# Patient Record
Sex: FEMALE | Race: WHITE | Employment: FULL TIME
[De-identification: names, ages, dates, MRNs, and addresses within clinical notes are randomized per-mention and may not be internally consistent; named-entity substitution may affect disease eponyms.]

---

## 2020-05-18 ENCOUNTER — NURSE TRIAGE (OUTPATIENT)
Dept: OTHER | Facility: CLINIC | Age: 30
End: 2020-05-18

## 2020-06-15 ENCOUNTER — OFFICE VISIT (OUTPATIENT)
Dept: PRIMARY CARE CLINIC | Age: 30
End: 2020-06-15

## 2020-06-15 PROCEDURE — 99211 OFF/OP EST MAY X REQ PHY/QHP: CPT | Performed by: NURSE PRACTITIONER

## 2020-06-15 NOTE — PROGRESS NOTES
FLU/COVID-19 CLINIC EVALUATION  HPI:Patient is in office today with concern for a known close exposure to COVID-19 Virus. The patient is requesting screening evaluation and COVID-19 testing. Symptom duration, days:  [] 1   [] 2    []3   [] 4    []5    []6   [] 7    []8    []9   [] 10    []11 []  12   [] 13    []14 or greater    There were no vitals filed for this visit. ROS:ROS negative unless otherwise noted below      []Fevers   []Headaches  []Sore throat  []Chest pain/heaviness  []Muscle aches  []Feeling short of breath  []Nausea   []Nasal Congestion  []Vomiting  []Chest Congestion    []Diarrhea  []Cough  []Loss of taste/smell  []Chills    OTHER SYMPTOMS:      RISK FACTORS:  []Pregnancy   []Diabetes  []Heart disease  []Asthma  []COPD/Other chronic lung diseases  []Active Cancer/Chemotherapy   []Taking oral steroids  []Close contact with a lab confirmed COVID-19 patient within 14 days of symptom onset  []Health Care Worker Exposure no symptoms  []Health Care Worker Exposure symptomatic    Assessment  Physical Exam    Constitutional:       Appearance: Normal appearance. HENT:      Head: Normocephalic and atraumatic. Mouth/Throat:      Mouth: Mucous membranes are moist.   Neck:      Musculoskeletal: Normal range of motion. Cardiovascular:     Skin pink and warm     Pulmonary:      Effort: Pulmonary effort is normal.   Musculoskeletal: Normal range of motion. Skin:     General: Skin is warm and dry. Neurological:      General: No focal deficit present. Mental Status: Alert. Mental status is at baseline. Test ordered:    [x]COVID    _________  []Strep    ___________  []Flu       _________      Diagnosis and Plan:      Diagnosis Orders   1. Suspected COVID-19 virus infection  Covid-19 Ambulatory     COVID-19 swab complete at clinic and sent to lab for testing. Quarantine order in place, patient verbalized understanding. Preventing the spread of Coronavirus, Possible COVID-19 exposure with

## 2020-06-17 LAB
SARS-COV-2: NOT DETECTED
SOURCE: NORMAL

## 2020-06-17 NOTE — RESULT ENCOUNTER NOTE
Please contact patient with their testing results: Your test for COVID-19, also known as novel coronavirus, came back negative. No virus was detected from the sample collected. Until your symptoms are fully resolved, you may still be contagious. We recommend that you remain isolated for 7 days minimum or 72 hours after your symptoms have completely resolved, whichever is longer. Continually monitor symptoms. Contact a medical provider if symptoms are worsening. If you have any additional questions, contact your PCP.     For additional information, please visit the Centers for Disease Control and Prevention   [email or web address]

## 2021-01-18 ENCOUNTER — OFFICE VISIT (OUTPATIENT)
Dept: GYNECOLOGY | Age: 31
End: 2021-01-18
Payer: COMMERCIAL

## 2021-01-18 VITALS
HEART RATE: 71 BPM | TEMPERATURE: 97.3 F | HEIGHT: 67 IN | DIASTOLIC BLOOD PRESSURE: 58 MMHG | BODY MASS INDEX: 21.66 KG/M2 | SYSTOLIC BLOOD PRESSURE: 105 MMHG | OXYGEN SATURATION: 98 % | RESPIRATION RATE: 16 BRPM | WEIGHT: 138 LBS

## 2021-01-18 DIAGNOSIS — Z01.419 WELL WOMAN EXAM WITH ROUTINE GYNECOLOGICAL EXAM: Primary | ICD-10-CM

## 2021-01-18 PROCEDURE — 99385 PREV VISIT NEW AGE 18-39: CPT | Performed by: OBSTETRICS & GYNECOLOGY

## 2021-01-18 PROCEDURE — 90471 IMMUNIZATION ADMIN: CPT | Performed by: OBSTETRICS & GYNECOLOGY

## 2021-01-18 PROCEDURE — 90651 9VHPV VACCINE 2/3 DOSE IM: CPT | Performed by: OBSTETRICS & GYNECOLOGY

## 2021-01-18 ASSESSMENT — ENCOUNTER SYMPTOMS
RECTAL PAIN: 0
ABDOMINAL DISTENTION: 0
TROUBLE SWALLOWING: 0
DIARRHEA: 0
SHORTNESS OF BREATH: 0
PHOTOPHOBIA: 0
BLOOD IN STOOL: 0
COUGH: 0
ABDOMINAL PAIN: 0
COLOR CHANGE: 0
APNEA: 0
NAUSEA: 0
SORE THROAT: 0
WHEEZING: 0
CONSTIPATION: 0
VOMITING: 0
CHEST TIGHTNESS: 0
ANAL BLEEDING: 0
BACK PAIN: 0

## 2021-01-18 NOTE — PROGRESS NOTES
Annual GYN Visit    Iram Da Silva  Date ofBirth:  1990    Date of Service:  2021    Chief Complaint:   Adalberto Gallardo is a 36 y.o. virginal [de-identified]  female who presents for routine annual gynecologic visit. HPI:  Patient is premenopausal- Patient's last menstrual period was 2021 (exact date). .  She is here for routine annual exam. Menses are regular with normal flow lasting 3-7 days, normal flow. She reports history of anorexia nervosa x 3 years - age [age] years and worked with a physician and dietician for this and per patient doing well. Had amenorrhea for 3 years at that time. BMI = 22 today.  Denies being sexually active     Health Maintenance   Topic Date Due    Hepatitis C screen  1990    Varicella vaccine (1 of 2 - 2-dose childhood series) 1993    HIV screen  2007    Cervical cancer screen  2013    DTaP/Tdap/Td vaccine (8 - Td) 2023    Hepatitis B vaccine  Completed    Hib vaccine  Completed    Meningococcal (ACWY) vaccine  Completed    Flu vaccine  Completed    Hepatitis A vaccine  Aged Out    Pneumococcal 0-64 years Vaccine  Aged Out       Past Medical History:   Diagnosis Date    Anorexia     [id number]     Past Surgical History:   Procedure Laterality Date    WISDOM TOOTH EXTRACTION       OB History    Para Term  AB Living   0 0 0 0 0 0   SAB TAB Ectopic Molar Multiple Live Births   0 0 0 0 0 0     Social History     Socioeconomic History    Marital status: Single     Spouse name: Not on file    Number of children: Not on file    Years of education: Not on file    Highest education level: Not on file   Occupational History    Not on file   Social Needs    Financial resource strain: Not on file    Food insecurity     Worry: Not on file     Inability: Not on file    Transportation needs     Medical: Not on file     Non-medical: Not on file   Tobacco Use    Smoking status: Never Smoker    Smokeless tobacco: Never Used Substance and Sexual Activity    Alcohol use: Yes     Comment: occ    Drug use: Never    Sexual activity: Never   Lifestyle    Physical activity     Days per week: Not on file     Minutes per session: Not on file    Stress: Not on file   Relationships    Social connections     Talks on phone: Not on file     Gets together: Not on file     Attends Moravian service: Not on file     Active member of club or organization: Not on file     Attends meetings of clubs or organizations: Not on file     Relationship status: Not on file    Intimate partner violence     Fear of current or ex partner: Not on file     Emotionally abused: Not on file     Physically abused: Not on file     Forced sexual activity: Not on file   Other Topics Concern    Not on file   Social History Narrative    Not on file     Allergies   Allergen Reactions    Cefaclor Other (See Comments)     In infancy    Amoxicillin     Nickel Rash     No outpatient medications have been marked as taking for the 1/18/21 encounter (Office Visit) with Yohannes Arrington MD.     Family History   Problem Relation Age of Onset    Cancer Maternal Aunt         Brain    Cancer Maternal Grandmother     Stroke Paternal Grandfather     Heart Failure Paternal Grandfather          Review of Systems:  Review of Systems   Constitutional: Negative for appetite change, chills, fatigue, fever and unexpected weight change. HENT: Negative for ear pain, hearing loss, mouth sores, sore throat and trouble swallowing. Eyes: Negative for photophobia and visual disturbance. Respiratory: Negative for apnea, cough, chest tightness, shortness of breath and wheezing. Cardiovascular: Negative for chest pain, palpitations and leg swelling. Gastrointestinal: Negative for abdominal distention, abdominal pain, anal bleeding, blood in stool, constipation, diarrhea, nausea, rectal pain and vomiting.    Endocrine: Negative for cold intolerance, heat intolerance, polydipsia, polyphagia and polyuria. Genitourinary: Negative for difficulty urinating, dyspareunia, dysuria, enuresis, frequency, genital sores, hematuria, menstrual problem, pelvic pain, urgency, vaginal bleeding, vaginal discharge and vaginal pain. Musculoskeletal: Negative for arthralgias, back pain, joint swelling and myalgias. Skin: Negative for color change and rash. Neurological: Negative for dizziness, seizures, syncope, light-headedness and headaches. Psychiatric/Behavioral: Negative for agitation, behavioral problems, confusion, decreased concentration, dysphoric mood, hallucinations, self-injury, sleep disturbance and suicidal ideas. The patient is not nervous/anxious and is not hyperactive. Physical Exam:  BP (!) 105/58 (Site: Right Upper Arm, Position: Sitting, Cuff Size: Medium Adult)   Pulse 71   Temp 97.3 °F (36.3 °C) (Tympanic)   Resp 16   Ht 5' 6.5\" (1.689 m)   Wt 138 lb (62.6 kg)   LMP 01/04/2021 (Exact Date)   SpO2 98%   Breastfeeding No   BMI 21.94 kg/m²   BP Readings from Last 3 Encounters:   01/18/21 (!) 105/58     Body mass index is 21.94 kg/m². Physical Exam  Constitutional:       General: She is not in acute distress. Appearance: She is well-developed. HENT:      Head: Normocephalic and atraumatic. Mouth/Throat:      Pharynx: No oropharyngeal exudate. Eyes:      General: No scleral icterus. Right eye: No discharge. Left eye: No discharge. Conjunctiva/sclera: Conjunctivae normal.   Neck:      Thyroid: No thyromegaly. Cardiovascular:      Rate and Rhythm: Normal rate and regular rhythm. Heart sounds: Normal heart sounds. Pulmonary:      Effort: Pulmonary effort is normal.      Breath sounds: Normal breath sounds. Abdominal:      General: Bowel sounds are normal. There is no distension. Palpations: Abdomen is soft. There is no mass. Tenderness: There is no abdominal tenderness. There is no guarding or rebound. Genitourinary:     Labia:         Right: No rash, tenderness, lesion or injury. Left: No rash, tenderness, lesion or injury. Vagina: Normal. No signs of injury and foreign body. No vaginal discharge, erythema or tenderness. Cervix: No cervical motion tenderness, discharge or friability. Uterus: Not deviated, not enlarged, not fixed and not tender. Adnexa:         Right: No mass, tenderness or fullness. Left: No mass, tenderness or fullness. Rectum: No mass or external hemorrhoid. Skin:     General: Skin is warm. Coloration: Skin is not pale. Findings: No erythema or rash. Neurological:      Mental Status: She is alert. Psychiatric:         Behavior: Behavior normal. Behavior is cooperative. Thought Content: Thought content normal.         Judgment: Judgment normal.           Assessment/Plan:  1. Well woman exam with routine gynecological exam  - PAP SMEAR  Self breast exam discussed and encouraged  Diet and exercise discussed  Discussed daily multi-vitamin and adequate calcium and vitamin D in diet  Safe sex and usage of condoms discussed   BCM - abstinence   gardisal discussed - first dose administered in office today     Return in about 2 months (around 3/18/2021).  gardisal #2

## 2021-02-08 ENCOUNTER — OFFICE VISIT (OUTPATIENT)
Dept: INTERNAL MEDICINE CLINIC | Age: 31
End: 2021-02-08
Payer: COMMERCIAL

## 2021-02-08 VITALS
BODY MASS INDEX: 21.82 KG/M2 | TEMPERATURE: 97.1 F | WEIGHT: 139 LBS | OXYGEN SATURATION: 97 % | HEART RATE: 89 BPM | DIASTOLIC BLOOD PRESSURE: 78 MMHG | SYSTOLIC BLOOD PRESSURE: 110 MMHG | HEIGHT: 67 IN

## 2021-02-08 DIAGNOSIS — R63.0 ANOREXIA: ICD-10-CM

## 2021-02-08 DIAGNOSIS — R20.2 NUMBNESS AND TINGLING: Primary | ICD-10-CM

## 2021-02-08 DIAGNOSIS — Z13.9 SCREENING FOR CONDITION: ICD-10-CM

## 2021-02-08 DIAGNOSIS — R23.1 PALLOR: ICD-10-CM

## 2021-02-08 DIAGNOSIS — R10.2 PELVIC PAIN: ICD-10-CM

## 2021-02-08 DIAGNOSIS — R20.0 NUMBNESS AND TINGLING: Primary | ICD-10-CM

## 2021-02-08 DIAGNOSIS — B35.1 ONYCHOMYCOSIS: ICD-10-CM

## 2021-02-08 LAB
BILIRUBIN, POC: NORMAL
BLOOD URINE, POC: NORMAL
CLARITY, POC: NORMAL
COLOR, POC: YELLOW
GLUCOSE URINE, POC: NORMAL
KETONES, POC: NORMAL
LEUKOCYTE EST, POC: NORMAL
NITRITE, POC: NORMAL
PH, POC: 7
PROTEIN, POC: NORMAL
SPECIFIC GRAVITY, POC: 1.02
UROBILINOGEN, POC: 0.2

## 2021-02-08 PROCEDURE — 99204 OFFICE O/P NEW MOD 45 MIN: CPT | Performed by: INTERNAL MEDICINE

## 2021-02-08 PROCEDURE — 81002 URINALYSIS NONAUTO W/O SCOPE: CPT | Performed by: INTERNAL MEDICINE

## 2021-02-08 RX ORDER — CLOTRIMAZOLE 1 G/ML
SOLUTION TOPICAL
Qty: 1 BOTTLE | Refills: 0 | Status: SHIPPED | OUTPATIENT
Start: 2021-02-08

## 2021-02-08 SDOH — ECONOMIC STABILITY: FOOD INSECURITY: WITHIN THE PAST 12 MONTHS, YOU WORRIED THAT YOUR FOOD WOULD RUN OUT BEFORE YOU GOT MONEY TO BUY MORE.: NEVER TRUE

## 2021-02-08 SDOH — ECONOMIC STABILITY: INCOME INSECURITY: HOW HARD IS IT FOR YOU TO PAY FOR THE VERY BASICS LIKE FOOD, HOUSING, MEDICAL CARE, AND HEATING?: NOT HARD AT ALL

## 2021-02-08 ASSESSMENT — PATIENT HEALTH QUESTIONNAIRE - PHQ9
1. LITTLE INTEREST OR PLEASURE IN DOING THINGS: 0
SUM OF ALL RESPONSES TO PHQ QUESTIONS 1-9: 0

## 2021-02-19 DIAGNOSIS — R20.2 NUMBNESS AND TINGLING: ICD-10-CM

## 2021-02-19 DIAGNOSIS — R23.1 PALLOR: ICD-10-CM

## 2021-02-19 DIAGNOSIS — R20.0 NUMBNESS AND TINGLING: ICD-10-CM

## 2021-02-19 DIAGNOSIS — Z13.9 SCREENING FOR CONDITION: ICD-10-CM

## 2021-02-19 LAB
A/G RATIO: 1.8 (ref 1.1–2.2)
ALBUMIN SERPL-MCNC: 4.5 G/DL (ref 3.4–5)
ALP BLD-CCNC: 53 U/L (ref 40–129)
ALT SERPL-CCNC: 13 U/L (ref 10–40)
ANION GAP SERPL CALCULATED.3IONS-SCNC: 9 MMOL/L (ref 3–16)
AST SERPL-CCNC: 16 U/L (ref 15–37)
BASOPHILS ABSOLUTE: 0.1 K/UL (ref 0–0.2)
BASOPHILS RELATIVE PERCENT: 1.2 %
BILIRUB SERPL-MCNC: 0.4 MG/DL (ref 0–1)
BUN BLDV-MCNC: 11 MG/DL (ref 7–20)
CALCIUM SERPL-MCNC: 9.6 MG/DL (ref 8.3–10.6)
CHLORIDE BLD-SCNC: 96 MMOL/L (ref 99–110)
CHOLESTEROL, TOTAL: 155 MG/DL (ref 0–199)
CO2: 28 MMOL/L (ref 21–32)
CREAT SERPL-MCNC: 0.8 MG/DL (ref 0.6–1.1)
EOSINOPHILS ABSOLUTE: 0 K/UL (ref 0–0.6)
EOSINOPHILS RELATIVE PERCENT: 0.7 %
GFR AFRICAN AMERICAN: >60
GFR NON-AFRICAN AMERICAN: >60
GLOBULIN: 2.5 G/DL
GLUCOSE BLD-MCNC: 88 MG/DL (ref 70–99)
HCT VFR BLD CALC: 35.4 % (ref 36–48)
HEMOGLOBIN: 11.8 G/DL (ref 12–16)
LYMPHOCYTES ABSOLUTE: 1.4 K/UL (ref 1–5.1)
LYMPHOCYTES RELATIVE PERCENT: 19.3 %
MAGNESIUM: 2.1 MG/DL (ref 1.8–2.4)
MCH RBC QN AUTO: 27.1 PG (ref 26–34)
MCHC RBC AUTO-ENTMCNC: 33.2 G/DL (ref 31–36)
MCV RBC AUTO: 81.6 FL (ref 80–100)
MONOCYTES ABSOLUTE: 0.5 K/UL (ref 0–1.3)
MONOCYTES RELATIVE PERCENT: 6.3 %
NEUTROPHILS ABSOLUTE: 5.4 K/UL (ref 1.7–7.7)
NEUTROPHILS RELATIVE PERCENT: 72.5 %
PDW BLD-RTO: 14.6 % (ref 12.4–15.4)
PLATELET # BLD: 345 K/UL (ref 135–450)
PMV BLD AUTO: 7.8 FL (ref 5–10.5)
POTASSIUM SERPL-SCNC: 4.4 MMOL/L (ref 3.5–5.1)
RBC # BLD: 4.34 M/UL (ref 4–5.2)
SODIUM BLD-SCNC: 133 MMOL/L (ref 136–145)
TOTAL PROTEIN: 7 G/DL (ref 6.4–8.2)
WBC # BLD: 7.4 K/UL (ref 4–11)

## 2021-02-20 LAB
FOLATE: 16.09 NG/ML (ref 4.78–24.2)
HEPATITIS C ANTIBODY INTERPRETATION: NORMAL
HIV AG/AB: NORMAL
HIV ANTIGEN: NORMAL
HIV-1 ANTIBODY: NORMAL
HIV-2 AB: NORMAL
TSH REFLEX: 1.45 UIU/ML (ref 0.27–4.2)
VITAMIN B-12: 694 PG/ML (ref 211–911)
VITAMIN D 25-HYDROXY: 38.6 NG/ML

## 2021-03-01 ENCOUNTER — NURSE ONLY (OUTPATIENT)
Dept: GYNECOLOGY | Age: 31
End: 2021-03-01
Payer: COMMERCIAL

## 2021-03-01 VITALS — TEMPERATURE: 96.7 F

## 2021-03-01 DIAGNOSIS — Z71.85 VACCINE COUNSELING: Primary | ICD-10-CM

## 2021-03-01 PROCEDURE — 90651 9VHPV VACCINE 2/3 DOSE IM: CPT | Performed by: OBSTETRICS & GYNECOLOGY

## 2021-03-01 PROCEDURE — 90471 IMMUNIZATION ADMIN: CPT | Performed by: OBSTETRICS & GYNECOLOGY

## 2021-10-05 ENCOUNTER — TELEPHONE (OUTPATIENT)
Dept: GYNECOLOGY | Age: 31
End: 2021-10-05

## 2021-10-05 ENCOUNTER — TELEPHONE (OUTPATIENT)
Dept: INTERNAL MEDICINE CLINIC | Age: 31
End: 2021-10-05

## 2021-10-05 DIAGNOSIS — R92.2 BREAST DENSITY: Primary | ICD-10-CM

## 2021-10-05 NOTE — TELEPHONE ENCOUNTER
Patient has notice over the past 2 months that the breast tissue has become firm and causing discomfort in her right breast. Patient is requesting what the next steps are.

## 2021-10-05 NOTE — TELEPHONE ENCOUNTER
Patient tried scheduling with NP in [address] [place] [address], they weren't able to schedule her until October 10-22. Patient feels that it's too long of a wait. Patient is very concerned that this could be cancer. She has found another breast center ([place] [address]) they need a referral faxed to them in order to open their schedule. Fax number is [phone]. Patient says that she is determined to find an appointment before the end of next week.       Patient can be reached at [phone] Polyps removed with cold snare

## 2021-10-05 NOTE — TELEPHONE ENCOUNTER
Patient is a new patient scheduled for 1/24/22, please advise how quickly do I need to move her up? ?

## 2021-10-05 NOTE — TELEPHONE ENCOUNTER
Go ahead and refer her to Breast Center-I think they can get her in faster. Have her keep January appointment with me.  [phone]

## 2021-10-05 NOTE — TELEPHONE ENCOUNTER
Refer her to Kyle Frankel the nurse practioner. Give her number again to call. Or Dr. Norma Casiano.

## 2021-10-05 NOTE — TELEPHONE ENCOUNTER
Patient called the number but was told to call central scheduling, after calling central scheduling she was told that she needed an order for a Diagnosis mammogram. Please advise.  Patient can be reached at [phone]

## 2021-10-05 NOTE — TELEPHONE ENCOUNTER
The following message was left on the Non-Emergency Line:    Patient is requesting her new patient appointment be moved up sooner stating that she does breast exams monthly, and has noticed a change in her R breast, stating the tissues is rapidly changing, is firmer and getting larger.     Please contact patient @ phone # provided regarding a sooner appointment

## 2021-10-06 NOTE — TELEPHONE ENCOUNTER
I spoke with pt she is going to see the GYN this week. She will call if she need to be seen she stated she is ok with seeing the gyn Dr. Robert Alves.

## 2021-10-07 ENCOUNTER — OFFICE VISIT (OUTPATIENT)
Dept: GYNECOLOGY | Age: 31
End: 2021-10-07
Payer: COMMERCIAL

## 2021-10-07 VITALS
BODY MASS INDEX: 23.11 KG/M2 | OXYGEN SATURATION: 98 % | RESPIRATION RATE: 17 BRPM | DIASTOLIC BLOOD PRESSURE: 72 MMHG | HEIGHT: 66 IN | WEIGHT: 143.8 LBS | HEART RATE: 82 BPM | SYSTOLIC BLOOD PRESSURE: 112 MMHG

## 2021-10-07 DIAGNOSIS — N64.4 BREAST TENDERNESS: ICD-10-CM

## 2021-10-07 DIAGNOSIS — R92.2 BREAST DENSITY: Primary | ICD-10-CM

## 2021-10-07 PROCEDURE — 99203 OFFICE O/P NEW LOW 30 MIN: CPT | Performed by: OBSTETRICS & GYNECOLOGY

## 2021-10-07 RX ORDER — AZITHROMYCIN 250 MG/1
250 TABLET, FILM COATED ORAL 2 TIMES DAILY
Qty: 10 TABLET | Refills: 0 | Status: SHIPPED | OUTPATIENT
Start: 2021-10-07 | End: 2021-10-12

## 2021-10-07 ASSESSMENT — ENCOUNTER SYMPTOMS
RESPIRATORY NEGATIVE: 1
GASTROINTESTINAL NEGATIVE: 1
EYES NEGATIVE: 1

## 2021-10-07 NOTE — PROGRESS NOTES
[address] [facility] [address]  Dept: [phone]  Dept Fax: [phone]  Loc: [phone]     10/7/2021    Olayinka Da Silva (:  1990) is a 36 y.o. female, here for evaluation of the following medical concerns:    Patient with breast mass/tenderness. Has gotten worse over the last week. Patient concerned. Is in middle of menstrual cycle. Gynecologic Exam  This is a new problem. The current episode started 1 to 4 weeks ago. The problem occurs daily. The problem has been gradually worsening. Pertinent negatives include no rash. Nothing aggravates the symptoms. She has tried nothing for the symptoms. The treatment provided no relief. Review of Systems   Constitutional: Negative. HENT: Negative. Eyes: Negative. Respiratory: Negative. Cardiovascular: Negative. Gastrointestinal: Negative. Genitourinary: Negative. Musculoskeletal: Negative. Skin: Negative. Negative for rash. Neurological: Negative. Psychiatric/Behavioral: Negative.       Date of Birth 1990  Past Medical History:   Diagnosis Date    Anorexia     [id number]    Anorexia     Bacterial vaginosis     Dysmenorrhea     Irregular uterine bleeding     Menorrhagia     Scoliosis      Past Surgical History:   Procedure Laterality Date    NOSE SURGERY      FFG SURGERY - CHILDHOOD    WISDOM TOOTH EXTRACTION  2007    WISDOM TOOTH EXTRACTION       OB History    Para Term  AB Living   0 0 0 0 0 0   SAB TAB Ectopic Molar Multiple Live Births   0 0 0 0 0 0     Social History     Socioeconomic History    Marital status: Single     Spouse name: Not on file    Number of children: Not on file    Years of education: Not on file    Highest education level: Not on file   Occupational History    Not on file   Tobacco Use    Smoking status: Never Smoker    Smokeless tobacco: Never Used   Vaping Use    Vaping Use: Never used   Substance and Sexual Activity    Alcohol use: Yes     Comment: OCCASIONAL    Drug use: Never    Sexual activity: Not Currently   Other Topics Concern    Not on file   Social History Narrative    Not on file     Social Determinants of Health     Financial Resource Strain: Low Risk     Difficulty of Paying Living Expenses: Not hard at all   Food Insecurity: No Food Insecurity    Worried About Running Out of Food in the Last Year: Never true    Nichole of Food in the Last Year: Never true   Transportation Needs: No Transportation Needs    Lack of Transportation (Medical): No    Lack of Transportation (Non-Medical): No   Physical Activity:     Days of Exercise per Week:     Minutes of Exercise per Session:    Stress:     Feeling of Stress :    Social Connections:     Frequency of Communication with Friends and Family:     Frequency of Social Gatherings with Friends and Family:     Attends Pentecostal Services:     Active Member of Clubs or Organizations:     Attends Club or Organization Meetings:     Marital Status:    Intimate Partner Violence:     Fear of Current or Ex-Partner:     Emotionally Abused:     Physically Abused:     Sexually Abused:       Allergies   Allergen Reactions    Cefaclor Other (See Comments)     In infancy    Amoxicillin     Nickel Rash     Outpatient Medications Marked as Taking for the 10/7/21 encounter (Office Visit) with Fredi Deng MD   Medication Sig Dispense Refill    azithromycin (ZITHROMAX) 250 MG tablet Take 1 tablet by mouth 2 times daily for 5 days 10 tablet 0    Probiotic Product (PROBIOTIC DAILY PO) Take by mouth daily      Multiple Vitamin (MULTIVITAMIN ADULT PO) Take by mouth       Family History   Problem Relation Age of Onset    High Blood Pressure Mother     Cancer Maternal Grandmother         BRAIN    Stroke Paternal Grandfather     Heart Failure Paternal Grandfather     Heart Disease Paternal Grandfather     Cancer Paternal Aunt         MELANOMA Financial Resource Strain: Low Risk     Difficulty of Paying Living Expenses: Not hard at all   Food Insecurity: No Food Insecurity    Worried About Running Out of Food in the Last Year: Never true    Nichole of Food in the Last Year: Never true   Transportation Needs: No Transportation Needs    Lack of Transportation (Medical): No    Lack of Transportation (Non-Medical): No   Physical Activity:     Days of Exercise per Week:     Minutes of Exercise per Session:    Stress:     Feeling of Stress :    Social Connections:     Frequency of Communication with Friends and Family:     Frequency of Social Gatherings with Friends and Family:     Attends Jehovah's witness Services:     Active Member of Clubs or Organizations:     Attends Club or Organization Meetings:     Marital Status:    Intimate Partner Violence:     Fear of Current or Ex-Partner:     Emotionally Abused:     Physically Abused:     Sexually Abused:         Family History   Problem Relation Age of Onset    High Blood Pressure Mother     Cancer Maternal Grandmother         BRAIN    Stroke Paternal Grandfather     Heart Failure Paternal Grandfather     Heart Disease Paternal Grandfather     Cancer Paternal Aunt         MELANOMA    Other Maternal Grandfather         DEMENTIA    Other Paternal Grandmother         DEMENTIA    Breast Cancer Other        Vitals:    10/07/21 0854   BP: 112/72   Site: Right Upper Arm   Position: Sitting   Cuff Size: Large Adult   Pulse: 82   Resp: 17   SpO2: 98%   Weight: 143 lb 12.8 oz (65.2 kg)   Height: 5' 6\" (1.676 m)     Estimated body mass index is 23.21 kg/m² as calculated from the following:    Height as of this encounter: 5' 6\" (1.676 m). Weight as of this encounter: 143 lb 12.8 oz (65.2 kg). Physical Exam  Constitutional:       Appearance: Normal appearance. She is normal weight. HENT:      Head: Normocephalic.       Right Ear: Tympanic membrane normal.      Nose: Nose normal. Mouth/Throat:      Mouth: Mucous membranes are moist.      Pharynx: Oropharynx is clear. Eyes:      Extraocular Movements: Extraocular movements intact. Pulmonary:      Effort: Pulmonary effort is normal.   Chest:      Breasts:         Right: Swelling, mass and tenderness present. No bleeding, inverted nipple, nipple discharge or skin change. Left: No swelling, bleeding, inverted nipple, mass, nipple discharge, skin change or tenderness. Comments: 2 cm firm, tender area inner right breast ? Blocked gland on nipple  Musculoskeletal:         General: Normal range of motion. Cervical back: Normal range of motion. Skin:     General: Skin is warm and dry. Neurological:      General: No focal deficit present. Mental Status: She is alert and oriented to person, place, and time. Mental status is at baseline. Psychiatric:         Mood and Affect: Mood normal.         Behavior: Behavior normal.         Thought Content: Thought content normal.         Judgment: Judgment normal.         ASSESSMENT/PLAN:  1. Breast density  -concern of cyst versus possible mastiti  - azithromycin (ZITHROMAX) 250 MG tablet; Take 1 tablet by mouth 2 times daily for 5 days  Dispense: 10 tablet; Refill: 0  - US BREAST COMPLETE RIGHT; Future    2. Breast tenderness  -EPO, Vitamin E, watch caffeine, sports bra  - azithromycin (ZITHROMAX) 250 MG tablet; Take 1 tablet by mouth 2 times daily for 5 days  Dispense: 10 tablet; Refill: 0  - US BREAST COMPLETE RIGHT; Future      Patient states she may not take antibiotics because concerned about taking them affecting pro-biotics/juan etc. Told patient I recommend them but cannot make her take them. Could risk infection getting worse if infection. Patient undertands.

## 2021-10-18 ENCOUNTER — HOSPITAL ENCOUNTER (OUTPATIENT)
Dept: ULTRASOUND IMAGING | Age: 31
Discharge: HOME | End: 2021-10-18
Payer: COMMERCIAL

## 2021-10-18 ENCOUNTER — TELEPHONE (OUTPATIENT)
Dept: GYNECOLOGY | Age: 31
End: 2021-10-18

## 2021-10-18 DIAGNOSIS — R92.2 BREAST DENSITY: ICD-10-CM

## 2021-10-18 DIAGNOSIS — N64.4 BREAST TENDERNESS: ICD-10-CM

## 2021-10-18 DIAGNOSIS — R92.2 BREAST DENSITY: Primary | ICD-10-CM

## 2021-10-18 PROCEDURE — 76642 ULTRASOUND BREAST LIMITED: CPT

## 2021-10-18 NOTE — TELEPHONE ENCOUNTER
Patient was seen last week. Wanted to first off say thank you so much for getting her in so quickly. Patient has finished her antibiotics. Symptoms have gotten worse. Discharge from nipple. They will not give her a mammogram until she sees Dr Bret Schwab again. Please advise.      Patient can be reached at [phone]

## 2021-10-21 ENCOUNTER — TELEPHONE (OUTPATIENT)
Dept: SURGERY | Age: 31
End: 2021-10-21

## 2021-10-22 ENCOUNTER — OFFICE VISIT (OUTPATIENT)
Dept: SURGERY | Age: 31
End: 2021-10-22
Payer: COMMERCIAL

## 2021-10-22 VITALS
RESPIRATION RATE: 18 BRPM | TEMPERATURE: 97.6 F | SYSTOLIC BLOOD PRESSURE: 104 MMHG | HEIGHT: 66 IN | OXYGEN SATURATION: 100 % | DIASTOLIC BLOOD PRESSURE: 64 MMHG | HEART RATE: 65 BPM | WEIGHT: 143.8 LBS | BODY MASS INDEX: 23.11 KG/M2

## 2021-10-22 DIAGNOSIS — R92.2 DENSE BREAST: ICD-10-CM

## 2021-10-22 DIAGNOSIS — N64.4 BREAST PAIN: ICD-10-CM

## 2021-10-22 DIAGNOSIS — N63.10 BREAST MASS, RIGHT: Primary | ICD-10-CM

## 2021-10-22 PROCEDURE — 99204 OFFICE O/P NEW MOD 45 MIN: CPT | Performed by: NURSE PRACTITIONER

## 2021-10-22 RX ORDER — VITAMIN E 268 MG
400 CAPSULE ORAL DAILY
COMMUNITY

## 2021-10-22 ASSESSMENT — ENCOUNTER SYMPTOMS
COUGH: 0
SHORTNESS OF BREATH: 0
ABDOMINAL PAIN: 0

## 2021-10-22 NOTE — PATIENT INSTRUCTIONS
Healthy Lifestyle Recommendations: healthy diet (decrease consumption of red meat, increase fresh fruits and vegetables), decreased alcohol consumption (less than 4 drinks/week), adequate sleep (goal 6-8 hours), routine exercise (goal 150 minutes/week or greater), weight control. Patient Education        Breast Self-Exam: Care Instructions  Your Care Instructions     A breast self-exam is when you check your breasts for lumps or changes. This regular exam helps you learn how your breasts normally look and feel. Most breast problems or changes are not because of cancer. Breast self-exam is not a substitute for a mammogram. Having regular breast exams by your doctor and regular mammograms improve your chances of finding any problems with your breasts. Some women set a time each month to do a step-by-step breast self-exam. Other women like a less formal system. They might look at their breasts as they brush their teeth, or feel their breasts once in a while in the shower. If you notice a change in your breast, tell your doctor. Follow-up care is a key part of your treatment and safety. Be sure to make and go to all appointments, and call your doctor if you are having problems. It's also a good idea to know your test results and keep a list of the medicines you take. How do you do a breast self-exam?  · The best time to examine your breasts is usually one week after your menstrual period begins. Your breasts should not be tender then. If you do not have periods, you might do your exam on a day of the month that is easy to remember. · To examine your breasts:  ? Remove all your clothes above the waist and lie down. When you are lying down, your breast tissue spreads evenly over your chest wall, which makes it easier to feel all your breast tissue. ?  Use the pads--not the fingertips--of the 3 middle fingers of your left hand to check your right breast. Move your fingers slowly in small coin-sized circles that overlap. ? Use three levels of pressure to feel of all your breast tissue. Use light pressure to feel the tissue close to the skin surface. Use medium pressure to feel a little deeper. Use firm pressure to feel your tissue close to your breastbone and ribs. Use each pressure level to feel your breast tissue before moving on to the next spot. ? Check your entire breast, moving up and down as if following a strip from the collarbone to the bra line, and from the armpit to the ribs. Repeat until you have covered the entire breast.  ? Repeat this procedure for your left breast, using the pads of the 3 middle fingers of your right hand. · To examine your breasts while in the shower:  ? Place one arm over your head and lightly soap your breast on that side. ? Using the pads of your fingers, gently move your hand over your breast (in the strip pattern described above), feeling carefully for any lumps or changes. ? Repeat for the other breast.  · Have your doctor inspect anything you notice to see if you need further testing. Where can you learn more? Go to https://[employer]. org and sign in to your [employer] account. Enter P148 in the [employer] box to learn more about \"Breast Self-Exam: Care Instructions. \"     If you do not have an account, please click on the \"Sign Up Now\" link. Current as of: December 17, 2020               Content Version: 13.0  © 9820-7625 [employer]wise, Incorporated. Care instructions adapted under license by [facility] ([facility]). If you have questions about a medical condition or this instruction, always ask your healthcare professional. [place] [address] any warranty or liability for your use of this information.

## 2021-10-22 NOTE — PROGRESS NOTES
Menstrual History:  Menarche age: 17  . Age first live birth: N/A  Breastfeed: N/A  Premenopausal    Oral contraceptive use: for about 1 year a decade ago  Hormone replacement therapy use: No    Breast density: Heterogeneously dense scattered fibroglandular density entirely fatty extremely dense   [employer]: no   Genetic testing: none     Family history significant for breast and ovarian cancer:           Diet: Gluten free, limited dairy  Alcohol: 1-2 drinks a week  Exercise:daily  Sleep: 6-8 hours nightly    Cigarette smoking: non-smoker  Trauma to the breast, neck or shoulder: denies  Chronic neck/shoulder/back pain: denies   New medications: no   Recent changes to menstrual cycle or hormone therapy: denies  Is pain related to menstrual cycle: denies   Bra size: 32 B  Implants: no  Supportive bra: yes no   Caffeine intake: one cofee daily  Fried/fatty food: rarely    Review of Systems   Constitutional: Negative for unexpected weight change. Eyes: Negative for visual disturbance. Respiratory: Negative for cough and shortness of breath. Cardiovascular: Negative for chest pain and palpitations. Gastrointestinal: Negative for abdominal pain. Musculoskeletal: Negative for arthralgias and myalgias. Neurological: Negative for headaches. Hematological: Negative for adenopathy. Does not bruise/bleed easily. Psychiatric/Behavioral: Negative for dysphoric mood. The patient is not nervous/anxious.

## 2021-10-22 NOTE — PROGRESS NOTES
Surgical Breast Oncology     Primary Care Provider: Ava Palomares MD    CC: Breast Mass, Right     Gill Corral is being seen at the request of Dr. Parvin Lynn for a consultation for breast mass. HPI: Ms. Gill Corral is a 36 y.o. woman who presents today with new right breast mass. She reports right breast firmness the size of a golf ball behind her right areola that is tender to the touch and has been present for 2 months. She reports 3 white spots on her nipple that she squeezed and a small amount of thick white material was expressed. Completed a zpack on Monday and has not noticed a change in the size of the mass or tenderness. She tried Vitamin E for ~1 week and did not notice a difference. She states that she does perform routine self breast evaluations and has not noticed any other abnormalities such as masses, skin changes, color changes, nipple discharge, or changes to the nipple-areolar complex. No injury or trauma. She has never required a breast biopsy. She had a right breast U/S on 10/18/2021 that showed normal dense breast tissue in the area of palpable concern. BI-RADS2.     Diet: Gluten free, limited dairy  Alcohol: 1-2 drinks a week  Exercise:daily  Sleep: 6-8 hours nightly     Cigarette smoking: non-smoker  Trauma to the breast, neck or shoulder: denies  Chronic neck/shoulder/back pain: denies   New medications: no   Recent changes to menstrual cycle or hormone therapy: denies  Is pain related to menstrual cycle: denies   Bra size: 32 B  Implants: no  Supportive bra: yes   Caffeine intake: one cofee daily  Fried/fatty food: rarely    Review of Systems    Past Medical History:   Diagnosis Date    Anorexia     [id number]    Anorexia     Bacterial vaginosis     Dysmenorrhea     Irregular uterine bleeding     Menorrhagia     Scoliosis        Past Surgical History:   Procedure Laterality Date    NOSE SURGERY      FFG SURGERY - CHILDHOOD    WISDOM TOOTH EXTRACTION  2007  WISDOM TOOTH EXTRACTION         Allergies as of 10/22/2021 - Fully Reviewed 10/22/2021   Allergen Reaction Noted    Cefaclor Other (See Comments) 2016    Amoxicillin  2021    Nickel Rash 2016       Social History     Tobacco Use    Smoking status: Never Smoker    Smokeless tobacco: Never Used   Vaping Use    Vaping Use: Never used   Substance Use Topics    Alcohol use: Yes     Comment: OCCASIONAL    Drug use: Never         Family History:  Maternal great aunt, breast cancer, DX? Maternal great grandmother, breast cancer, DX [de-identified]? Male  Strong family history of prostate cancer. No additional family history of breast or ovarian cancer. Menstrual History:  Menarche age: 17  . Age first live birth: N/A  Breastfeed: N/A  Premenopausal     Oral contraceptive use: for about 1 year a decade ago  Hormone replacement therapy use: No     Breast density: no prior mammogram   [employer]: no   Genetic testing: none     [unfilled]  Medication documentation has been reviewed in the electronic medical record and patient office intake form. REVIEW OF SYSTEMS:  Constitutional: Negative for unexpected weight change. Eyes: Negative for visual disturbance. Respiratory: Negative for cough and shortness of breath. Cardiovascular: Negative for chest pain and palpitations. Gastrointestinal: Negative for abdominal pain. Musculoskeletal: Negative for arthralgias and myalgias. Neurological: Negative for headaches. Hematological: Negative for adenopathy. Does not bruise/bleed easily. Psychiatric/Behavioral: Negative for dysphoric mood. The patient is not nervous/anxious.         EXAM:  /64 (Site: Right Upper Arm, Position: Sitting, Cuff Size: Medium Adult)   Pulse 65   Temp 97.6 °F (36.4 °C)   Resp 18   Ht 5' 6\" (1.676 m)   Wt 143 lb 12.8 oz (65.2 kg)   LMP 10/04/2021   SpO2 100%   BMI 23.21 kg/m²   Physical Exam  Constitutional: She results which may lead to possible biopsy of concerning palpable area.  Discussed the importance of breast awareness including the importance and technique of self breast exams   Most recent imaging from 10/22/2021 was reviewed and the results were discussed with the patient, all questions answered   Education provided for Healthy Lifestyle Recommendations: healthy diet, routine exercise, weight control, decreased alcohol consumption.  Will call patient with results and plan follow up accordingly. STACY Hare-CNP  [facility])   Surgical Breast Oncology   [phone]    All of the patient's questions were answered at this time however, she was encouraged to call the office with any further inquiries. Approximately 45 minutes of time were spent in preparation, direct patient contact, counseling, care coordination, documentation and activities otherwise related to this encounter.

## 2021-10-26 ENCOUNTER — TELEPHONE (OUTPATIENT)
Dept: SURGERY | Age: 31
End: 2021-10-26

## 2021-10-29 NOTE — TELEPHONE ENCOUNTER
Please clarify. I am not sure what an orbits xray for MRI is. This is not something I would order. Does she need that imaging for another provider?

## 2021-11-01 NOTE — TELEPHONE ENCOUNTER
Central Scheduling said she needed this for possible metal in your eyes, to make sure there are no fragments to continue with the MRI. They did not give me any codes or what type.   Thank you

## 2021-11-02 NOTE — TELEPHONE ENCOUNTER
Spoke to patient and she was about to start a work call and asked if she could call me back tomorrow. Patient states she will call me back about her MRI tomorrow 11/3/21 at 4 pm. Patient also informed me that she cancelled her MRI.

## 2021-11-02 NOTE — TELEPHONE ENCOUNTER
Spoke to [facility]. Patient was asked a series of questions and one of those questions is [facility] you ever had an eye injury where metal has entered the eye? \" the patient stated yes. Before an MRI can be completed the patient must have an xray of her eyes to determine if metal is in fact in her eyes.

## 2021-11-02 NOTE — TELEPHONE ENCOUNTER
Please look into this and the rational.  This is not routine. I have never ordered previously. Has patient has surgery or a different medical condition that would require them to ask for this order?

## 2021-11-02 NOTE — TELEPHONE ENCOUNTER
Okay, if patient plans to proceed with her MRI, which I recommend, then we will order the requested imaging.

## 2025-01-30 NOTE — TELEPHONE ENCOUNTER
Called spoke with patient gave her instructions to call the breast center, tell them there is a referral in her chart to see Jelani Blakely the NP, she can actually get in with anyone within that group.  Patient will try again, says she tried before but was told she needed to call centralized scheduling, patient will call us back if there is any problem DONE Yes - the patient is able to be screened